# Patient Record
Sex: FEMALE | Race: WHITE | URBAN - METROPOLITAN AREA
[De-identification: names, ages, dates, MRNs, and addresses within clinical notes are randomized per-mention and may not be internally consistent; named-entity substitution may affect disease eponyms.]

---

## 2023-12-30 ENCOUNTER — OFFICE VISIT (OUTPATIENT)
Dept: URGENT CARE | Facility: CLINIC | Age: 38
End: 2023-12-30
Payer: COMMERCIAL

## 2023-12-30 DIAGNOSIS — H10.9 CONJUNCTIVITIS OF BOTH EYES, UNSPECIFIED CONJUNCTIVITIS TYPE: ICD-10-CM

## 2023-12-30 DIAGNOSIS — J02.9 SORE THROAT: ICD-10-CM

## 2023-12-30 DIAGNOSIS — B34.9 VIRAL ILLNESS: Primary | ICD-10-CM

## 2023-12-30 LAB
POC RAPID STREP: NEGATIVE
POC TRIPLEX FLU A-AG: NORMAL
POC TRIPLEX FLU B-AG: NORMAL
POC TRIPLEX SARSCOV-2 AG: NORMAL

## 2023-12-30 PROCEDURE — 99203 OFFICE O/P NEW LOW 30 MIN: CPT | Performed by: FAMILY MEDICINE

## 2023-12-30 PROCEDURE — 87880 STREP A ASSAY W/OPTIC: CPT | Performed by: FAMILY MEDICINE

## 2023-12-30 PROCEDURE — 87428 SARSCOV & INF VIR A&B AG IA: CPT | Performed by: FAMILY MEDICINE

## 2023-12-30 RX ORDER — POLYMYXIN B SULFATE AND TRIMETHOPRIM 1; 10000 MG/ML; [USP'U]/ML
1 SOLUTION OPHTHALMIC EVERY 6 HOURS
Qty: 10 ML | Refills: 0 | Status: SHIPPED | OUTPATIENT
Start: 2023-12-30 | End: 2024-01-04

## 2023-12-30 NOTE — PROGRESS NOTES
37 yo  States scratchy throat for 6 days  Notes throat pain is worse 'like swallowing glass', had vomiting last night when drove home from [place] after spending time with family 4 days ago who was diagnosed with covid and flu.  No fever  States eyes red and thick mucous in mornings but none during day  Notes headache     Pmh healthy    Meds- bcp    Social nonsmoker    Exam-  Afebrile  Ears normal  Throat- no redness. Strep negative  Neck no lad  Lungs clear    sTrep- negative  Covid / flu negative    A/p  Viral syndrome with conjunctivitis ( viral vs bacterial) -  Drink fluids  Rest  Take ibuprofen for pain  Use polytrim eye drops for 5 days then stop  Return if symptoms prolonged or worsen  Raya Schilling MD

## 2023-12-30 NOTE — PATIENT INSTRUCTIONS
Drink fluids  Rest  Take ibuprofen for pain  Use polytrim eye drops for 5 days then stop  Return if symptoms prolonged or worsen